# Patient Record
Sex: FEMALE
[De-identification: names, ages, dates, MRNs, and addresses within clinical notes are randomized per-mention and may not be internally consistent; named-entity substitution may affect disease eponyms.]

---

## 2024-03-05 ENCOUNTER — NON-APPOINTMENT (OUTPATIENT)
Age: 59
End: 2024-03-05

## 2024-03-05 ENCOUNTER — APPOINTMENT (OUTPATIENT)
Dept: CARDIOLOGY | Facility: HOSPITAL | Age: 59
End: 2024-03-05

## 2024-03-05 VITALS
WEIGHT: 116 LBS | HEART RATE: 70 BPM | DIASTOLIC BLOOD PRESSURE: 93 MMHG | SYSTOLIC BLOOD PRESSURE: 176 MMHG | OXYGEN SATURATION: 96 % | HEIGHT: 56 IN | BODY MASS INDEX: 26.1 KG/M2

## 2024-03-05 VITALS — SYSTOLIC BLOOD PRESSURE: 161 MMHG | DIASTOLIC BLOOD PRESSURE: 83 MMHG

## 2024-03-05 DIAGNOSIS — E11.9 TYPE 2 DIABETES MELLITUS W/OUT COMPLICATIONS: ICD-10-CM

## 2024-03-05 DIAGNOSIS — I25.10 ATHEROSCLEROTIC HEART DISEASE OF NATIVE CORONARY ARTERY W/OUT ANGINA PECTORIS: ICD-10-CM

## 2024-03-05 DIAGNOSIS — I10 ESSENTIAL (PRIMARY) HYPERTENSION: ICD-10-CM

## 2024-03-05 DIAGNOSIS — I25.118 ATHEROSCLEROTIC HEART DISEASE OF NATIVE CORONARY ARTERY WITH OTHER FORMS OF ANGINA PECTORIS: ICD-10-CM

## 2024-03-05 DIAGNOSIS — E78.5 HYPERLIPIDEMIA, UNSPECIFIED: ICD-10-CM

## 2024-03-05 PROBLEM — Z00.00 ENCOUNTER FOR PREVENTIVE HEALTH EXAMINATION: Status: ACTIVE | Noted: 2024-03-05

## 2024-03-05 RX ORDER — CLOPIDOGREL BISULFATE 75 MG/1
75 TABLET, FILM COATED ORAL DAILY
Qty: 90 | Refills: 3 | Status: ACTIVE | COMMUNITY
Start: 2024-03-05 | End: 1900-01-01

## 2024-03-05 RX ORDER — METOPROLOL SUCCINATE 50 MG/1
50 TABLET, EXTENDED RELEASE ORAL
Qty: 30 | Refills: 1 | Status: ACTIVE | COMMUNITY
Start: 2024-03-05 | End: 1900-01-01

## 2024-03-05 RX ORDER — LOSARTAN POTASSIUM 50 MG/1
50 TABLET, FILM COATED ORAL DAILY
Qty: 30 | Refills: 3 | Status: ACTIVE | COMMUNITY
Start: 2024-03-05 | End: 1900-01-01

## 2024-03-05 NOTE — PHYSICAL EXAM
[Well Nourished] : well nourished [Well Developed] : well developed [No Acute Distress] : no acute distress [Normal Venous Pressure] : normal venous pressure [No Carotid Bruit] : no carotid bruit [Normal S1, S2] : normal S1, S2 [No Murmur] : no murmur [Clear Lung Fields] : clear lung fields [Good Air Entry] : good air entry [Non Tender] : non-tender [Soft] : abdomen soft [No Edema] : no edema [Normal] : moves all extremities, no focal deficits, normal speech

## 2024-03-05 NOTE — ASSESSMENT
[FreeTextEntry1] : 58F with pmhx of CAD s/p PCI (January 2024 at [facility], unknown which vessel), HTN, HLD, T2DM presenting to establish care.  Patient and family to bring records to next visit Will order TTE to assess LV function Continue DAPT, statin Increase Toprol to 50mg qd Increase Losartan to 50mg qd Check labs today Follow up in 3 months

## 2024-03-05 NOTE — HISTORY OF PRESENT ILLNESS
[FreeTextEntry1] : 58F with pmhx of CAD s/p PCI (January 2024 at [facility], unknown which vessel), HTN, HLD, T2DM presenting to establish care.  Limited history as patient and daughter present at bedside with limited insight into events. Patient initially presented to [facility] with SOB, MARTINEZ, chest pain, and ?dysphagia. Underwent LHC w/ PCI- unclear which vessel and how many stents Had an echo but unsure of results.  Discharged on following meds: asa 81mg qd plavix 75mg qd losartan 25mg qd crestor 40mg qd metformin 500 bid metoprolol succinate 25mg qd gabapentin tradjenta  has been compliant with medications since discharge bp at home 140s-150s/60s after medications no chest pain, sob, palpitations, MARTINEZ, LE edema

## 2024-03-05 NOTE — REASON FOR VISIT
[Hypertension] : hypertension [Coronary Artery Disease] : coronary artery disease [Family Member] : family member [FreeTextEntry1] : 58F with pmhx of CAD s/p PCI (January 2024 at [facility], unknown which vessel), HTN, HLD, T2DM presenting to establish care.

## 2024-05-28 ENCOUNTER — APPOINTMENT (OUTPATIENT)
Dept: CARDIOLOGY | Facility: HOSPITAL | Age: 59
End: 2024-05-28